# Patient Record
(demographics unavailable — no encounter records)

---

## 2025-03-10 NOTE — HISTORY OF PRESENT ILLNESS
[FreeTextEntry1] : This is a 51-year-old male with history of hyperlipidemia, who presents for evaluation of colon cancer screening.  The patient has never had colon cancer screening before.  He reports 1 bowel movement a day, but usually they are hard and he has to strain.  The stools are formed, and he will see sometimes small amounts of blood on top of the stool.  The blood is only a few small drops.  No melena.  He denies abdominal pain or rectal pain.  He denies any reflux, dysphagia, trouble eating or swallowing.  No abnormal weight loss.  No prior endoscopy or colonoscopy.  There is no known colon cancer in his family.  He is able to walk more than a block before any dyspnea or chest pain.  He does not drink more than 6 to 8 glasses of water a day.

## 2025-03-10 NOTE — CONSULT LETTER
[Dear  ___] : Dear  [unfilled], [Consult Letter:] : I had the pleasure of evaluating your patient, [unfilled]. [Please see my note below.] : Please see my note below. [Consult Closing:] : Thank you very much for allowing me to participate in the care of this patient.  If you have any questions, please do not hesitate to contact me. [Sincerely,] : Sincerely, [FreeTextEntry2] : Dr. Shaikh Banerjee [FreeTextEntry3] : Jason Khan MD [facility]  [facility] Medicine, [facility] Tel: [phone] Fax: [phone]

## 2025-03-10 NOTE — PHYSICAL EXAM
[Alert] : alert [Normal Voice/Communication] : normal voice/communication [Healthy Appearing] : healthy appearing [No Acute Distress] : no acute distress [Sclera] : the sclera and conjunctiva were normal [Hearing Threshold Finger Rub Not [place]] : hearing was normal [Normal Appearance] : the appearance of the neck was normal [No Respiratory Distress] : no respiratory distress [No Acc Muscle Use] : no accessory muscle use [Respiration, Rhythm And Depth] : normal respiratory rhythm and effort [Auscultation Breath Sounds / Voice Sounds] : lungs were clear to auscultation bilaterally [Heart Rate And Rhythm] : heart rate was normal and rhythm regular [Normal S1, S2] : normal S1 and S2 [Murmurs] : no murmurs [Bowel Sounds] : normal bowel sounds [Abdomen Tenderness] : non-tender [No Masses] : no abdominal mass palpated [Abdomen Soft] : soft [No CVA Tenderness] : no CVA  tenderness [Abnormal Walk] : normal gait [Normal Color / Pigmentation] : normal skin color and pigmentation [No Focal Deficits] : no focal deficits [Oriented To Time, Place, And Person] : oriented to person, place, and time

## 2025-03-10 NOTE — ASSESSMENT
[FreeTextEntry1] : Impression: 1. Colon cancer screening - average risk, due for screening now 2. Chronic constipation - small amount of blood occurring with straining likely due to outlet bleeding, will rule out IBD/colon cancer on colonoscopy  Plan: -Asked patient to increase p.o. fluid water intake and to increase fiber in diet -Asked patient to take MiraLAX 17 g dissolved in water once a day for constipation -The rationale for colon cancer screening, to find and remove pre-malignant polyps to prevent colon cancer was explained. -Recommended colonoscopy for screening purposes, as he also has small amounts of blood in stool, not a good candidate for non-invasive stool testing or screening with virtual colonography -Risks and benefits of colonoscopy including but not limited to bleeding, infection, missed lesions, perforation, injury to internal organs, anesthesia/drug side effects were discussed with patient. All questions answered. Patient is agreeable to the procedure. -Diet and bowel prep instructions provided to patient; made him aware that he needs a chaperone to go home with after the procedure